# Patient Record
Sex: FEMALE | Race: WHITE | Employment: OTHER | URBAN - METROPOLITAN AREA
[De-identification: names, ages, dates, MRNs, and addresses within clinical notes are randomized per-mention and may not be internally consistent; named-entity substitution may affect disease eponyms.]

---

## 2022-06-16 ENCOUNTER — DOCUMENTATION ONLY (OUTPATIENT)
Dept: CASE MANAGEMENT | Age: 47
End: 2022-06-16

## 2022-06-16 NOTE — ACP (ADVANCE CARE PLANNING)
Advance Care Planning   Ambulatory ACP Specialist Patient Outreach    Date:  6/16/2022 , 6/17/22, 6/28/22    ACP Specialist:  Mouna Longo LCSW  Outreach call to patient in follow-up to ACP Specialist referral from:    [] PCP  [] Provider   [] Ambulatory Care Management [x] Other     For:                  [x] Advance Directive Assistance              [] Complete Portable DNR order              [] Complete POST/MOST              [] Code Status Discussion             [] Discuss Goals of Care             [] Early ACP Decision-Making              [] Other (Specify)    Date Referral Received:6/16/22    Today's Outreach:  [] First   [x] Second  [] Third       Third outreach made by: [] Phone  [] Email / mail    [] [employer]     Intervention:  [x] Spoke with Patient   [] Left VM requesting return call      Outcome: Pt's spouse had initial appointment this AM. Unfortunately there was a conflicting appointment. A new appointment be set for 6/21/22. Pt requested to have her own ACP discussion during that same call. 6/17- Received call from Pt that she will need to reschedule her appt. New appt set for 6/29.    6/28- A reminder call was provided regarding Pts upcoming ACP appointment. Unfortunately, there was no answer at the time of the call. A VM was left with the date/time of appt. Next Step:   [x] ACP scheduled conversation  [] Outreach again in one week               [] Email / Mail ACP Info Sheets  [] Email / Mail Advance Directive   [] Closing referral.  Routing closure to referring provider/staff and to ACP Specialist . [] Closure letter mailed to patient with invitation to contact ACP Specialist if / when ready.   Thank you for this referral.         Mouna Longo LCSW, [place] [id number] [address]

## 2022-06-17 ENCOUNTER — DOCUMENTATION ONLY (OUTPATIENT)
Dept: CASE MANAGEMENT | Age: 47
End: 2022-06-17

## 2022-06-28 ENCOUNTER — DOCUMENTATION ONLY (OUTPATIENT)
Dept: CASE MANAGEMENT | Age: 47
End: 2022-06-28

## 2022-06-29 ENCOUNTER — DOCUMENTATION ONLY (OUTPATIENT)
Dept: CASE MANAGEMENT | Age: 47
End: 2022-06-29

## 2022-06-29 NOTE — ACP (ADVANCE CARE PLANNING)
Advance Care Planning     Advance Care Planning Clinical Specialist  Conversation Note      Date of ACP Conversation: 06/29/22    Conversation Conducted with:  Patient with Decision Making Capacity    ACP Clinical Specialist: Goyo Valdovinos LCSW    Health Care Decision Maker:    Current Designated Health Care Decision Maker:     Primary Decision Maker: Michael Soto - Spouse - [phone]    Secondary Decision Maker: Onelia Tineo - Daughter - [phone]    Today we identified her HCDMs, discussed her wishes in various medical scenarios, and discussed the AMD document. Care Preferences    Hospitalization: \"If your health worsens and it becomes clear that your chance of recovery is unlikely, what would your preference be regarding hospitalization? \"    Choice:  []  The patient wants hospitalization  [x]  The patient prefers comfort-focused treatment without hospitalization. Ventilation: \"If you were in your present state of health and suddenly became very ill and were unable to breathe on your own, what would your preference be about the use of a ventilator (breathing machine) if it were available to you? \"      If patient would desire the use of a ventilator (breathing machine), answer \"yes\", if not \"no\":yes,\"I would want to give it a try. \"     \"If your health worsens and it becomes clear that your chance of recovery is unlikely, what would your preference be about the use of a ventilator (breathing machine) if it were available to you? \"     Would the patient desire the use of a ventilator (breathing machine)? NO      Resuscitation  \"CPR works best to restart the heart when there is a sudden event, like a heart attack, in someone who is otherwise healthy. Unfortunately, CPR does not typically restart the heart for people who have serious health conditions or who are very sick. \"    \"In the event your heart stopped as a result of an underlying serious health condition, would you want attempts to be made to restart your heart (answer \"yes\" for attempt to resuscitate) or would you prefer a natural death (answer \"no\" for do not attempt to resuscitate)? \" yes, At this time, Pt would choose to receive CPR regardless of the circumstances surrounding her arrest.       [] Yes  [x] No   Educated Patient / [place] regarding differences between Advance Directives and portable DNR orders. Length of ACP Conversation in minutes:  45m    Conversation Outcomes:  [x] ACP discussion completed  [] Existing advance directive reviewed with patient; no changes to patient's previously recorded wishes   [x] New Advance Directive reviewed   [] Portable Do Not Resuscitate prepared for Provider review and signature  [] POLST/POST/MOLST/MOST prepared for Provider review and signature      Follow-up plan:    [] Schedule follow-up conversation to continue planning  [] Referred individual to Provider for additional questions/concerns   [x] Advised patient/agent/surrogate to review completed ACP document and update if needed with changes in condition, patient preferences or care setting     [] This note routed to one or more involved healthcare providers    6/29- Mrs. Kurt Adams was an engaged participant in her conversation. She successfully identified her two HCDMs. A new ACP document was reviewed and a draft copy was provided. However, she indicated a desire to take some time to review the document and speak with her  before completing it. Mrs. Kurt Adams was provided with the direct contact information of the ACP Team, so that she may reach out directly, if/when she would like to proceed with finalizing her documents. This referral will be recommended for closure; however the ACP team remains available to assist again in the future.       Anthony Kellogg, BEBETOW, [facility]  Advance Care   Population Health

## 2025-01-03 ENCOUNTER — HOSPITAL ENCOUNTER (OUTPATIENT)
Facility: HOSPITAL | Age: 50
Discharge: HOME | End: 2025-01-03
Payer: COMMERCIAL

## 2025-01-03 DIAGNOSIS — S86.111A RUPTURE OF POSTERIOR TIBIALIS TENDON, RIGHT, INITIAL ENCOUNTER: ICD-10-CM

## 2025-01-03 DIAGNOSIS — M25.571 RIGHT ANKLE PAIN, UNSPECIFIED CHRONICITY: ICD-10-CM

## 2025-01-03 PROCEDURE — 73721 MRI JNT OF LWR EXTRE W/O DYE: CPT
